# Patient Record
Sex: MALE | Race: WHITE | NOT HISPANIC OR LATINO | Employment: FULL TIME | URBAN - METROPOLITAN AREA
[De-identification: names, ages, dates, MRNs, and addresses within clinical notes are randomized per-mention and may not be internally consistent; named-entity substitution may affect disease eponyms.]

---

## 2018-07-31 ENCOUNTER — TELEPHONE (OUTPATIENT)
Dept: ENDOCRINOLOGY | Facility: CLINIC | Age: 28
End: 2018-07-31

## 2018-07-31 NOTE — TELEPHONE ENCOUNTER
Called and spoke to the patients mother Roya whom says that they had to reschedule the appointment and will try reschedule online or call back if they can not.

## 2018-07-31 NOTE — TELEPHONE ENCOUNTER
----- Message from Mayuri Birch sent at 7/27/2018  9:46 AM CDT -----  Contact: Candy Orr (Mother)  Candy Orr (Mother) calling to reschedule appointment for later time on 8/1/18. She states that patient would like a time between 12 pm-2 pm. No available appointments. Please advise.   Call back    Thanks!

## 2018-11-15 ENCOUNTER — LAB VISIT (OUTPATIENT)
Dept: LAB | Facility: HOSPITAL | Age: 28
End: 2018-11-15
Attending: PHYSICIAN ASSISTANT
Payer: COMMERCIAL

## 2018-11-15 ENCOUNTER — OFFICE VISIT (OUTPATIENT)
Dept: ENDOCRINOLOGY | Facility: CLINIC | Age: 28
End: 2018-11-15
Payer: COMMERCIAL

## 2018-11-15 VITALS
HEIGHT: 77 IN | TEMPERATURE: 98 F | HEART RATE: 60 BPM | BODY MASS INDEX: 20.96 KG/M2 | RESPIRATION RATE: 18 BRPM | SYSTOLIC BLOOD PRESSURE: 118 MMHG | WEIGHT: 177.5 LBS | DIASTOLIC BLOOD PRESSURE: 69 MMHG

## 2018-11-15 DIAGNOSIS — E10.9 TYPE 1 DIABETES MELLITUS WITHOUT COMPLICATION: ICD-10-CM

## 2018-11-15 DIAGNOSIS — E27.1 ADDISON'S DISEASE: ICD-10-CM

## 2018-11-15 DIAGNOSIS — Z96.41 INSULIN PUMP STATUS: ICD-10-CM

## 2018-11-15 DIAGNOSIS — E27.1 ADDISON'S DISEASE: Primary | ICD-10-CM

## 2018-11-15 LAB
25(OH)D3+25(OH)D2 SERPL-MCNC: 36 NG/ML
ALBUMIN SERPL BCP-MCNC: 3.6 G/DL
ALP SERPL-CCNC: 52 U/L
ALT SERPL W/O P-5'-P-CCNC: 24 U/L
ANION GAP SERPL CALC-SCNC: 7 MMOL/L
AST SERPL-CCNC: 28 U/L
BASOPHILS # BLD AUTO: 0.13 K/UL
BASOPHILS NFR BLD: 1.4 %
BILIRUB SERPL-MCNC: 0.8 MG/DL
BUN SERPL-MCNC: 14 MG/DL
CALCIUM SERPL-MCNC: 9.2 MG/DL
CHLORIDE SERPL-SCNC: 102 MMOL/L
CO2 SERPL-SCNC: 29 MMOL/L
CORTIS SERPL-MCNC: <1 UG/DL
CREAT SERPL-MCNC: 0.9 MG/DL
DIFFERENTIAL METHOD: NORMAL
EOSINOPHIL # BLD AUTO: 0.4 K/UL
EOSINOPHIL NFR BLD: 4.6 %
ERYTHROCYTE [DISTWIDTH] IN BLOOD BY AUTOMATED COUNT: 12.9 %
EST. GFR  (AFRICAN AMERICAN): >60 ML/MIN/1.73 M^2
EST. GFR  (NON AFRICAN AMERICAN): >60 ML/MIN/1.73 M^2
ESTIMATED AVG GLUCOSE: 232 MG/DL
GLUCOSE SERPL-MCNC: 181 MG/DL
HBA1C MFR BLD HPLC: 9.7 %
HCT VFR BLD AUTO: 43.6 %
HGB BLD-MCNC: 14.5 G/DL
IMM GRANULOCYTES # BLD AUTO: 0.04 K/UL
IMM GRANULOCYTES NFR BLD AUTO: 0.4 %
LYMPHOCYTES # BLD AUTO: 2.8 K/UL
LYMPHOCYTES NFR BLD: 28.8 %
MCH RBC QN AUTO: 31 PG
MCHC RBC AUTO-ENTMCNC: 33.3 G/DL
MCV RBC AUTO: 93 FL
MONOCYTES # BLD AUTO: 0.7 K/UL
MONOCYTES NFR BLD: 7 %
NEUTROPHILS # BLD AUTO: 5.5 K/UL
NEUTROPHILS NFR BLD: 57.8 %
NRBC BLD-RTO: 0 /100 WBC
PLATELET # BLD AUTO: 194 K/UL
PMV BLD AUTO: 12.5 FL
POTASSIUM SERPL-SCNC: 3.6 MMOL/L
PROT SERPL-MCNC: 6.1 G/DL
RBC # BLD AUTO: 4.68 M/UL
SODIUM SERPL-SCNC: 138 MMOL/L
T3 SERPL-MCNC: 87 NG/DL
T4 FREE SERPL-MCNC: 1.07 NG/DL
TSH SERPL DL<=0.005 MIU/L-ACNC: 0.59 UIU/ML
WBC # BLD AUTO: 9.55 K/UL

## 2018-11-15 PROCEDURE — 84443 ASSAY THYROID STIM HORMONE: CPT

## 2018-11-15 PROCEDURE — 84480 ASSAY TRIIODOTHYRONINE (T3): CPT

## 2018-11-15 PROCEDURE — 82533 TOTAL CORTISOL: CPT

## 2018-11-15 PROCEDURE — 84378 SUGARS SINGLE QUANT: CPT

## 2018-11-15 PROCEDURE — 85025 COMPLETE CBC W/AUTO DIFF WBC: CPT

## 2018-11-15 PROCEDURE — 82306 VITAMIN D 25 HYDROXY: CPT

## 2018-11-15 PROCEDURE — 80053 COMPREHEN METABOLIC PANEL: CPT

## 2018-11-15 PROCEDURE — 84244 ASSAY OF RENIN: CPT

## 2018-11-15 PROCEDURE — 82088 ASSAY OF ALDOSTERONE: CPT

## 2018-11-15 PROCEDURE — 82985 ASSAY OF GLYCATED PROTEIN: CPT

## 2018-11-15 PROCEDURE — 36415 COLL VENOUS BLD VENIPUNCTURE: CPT | Mod: PO

## 2018-11-15 PROCEDURE — 3008F BODY MASS INDEX DOCD: CPT | Mod: CPTII,S$GLB,, | Performed by: PHYSICIAN ASSISTANT

## 2018-11-15 PROCEDURE — 99205 OFFICE O/P NEW HI 60 MIN: CPT | Mod: S$GLB,,, | Performed by: PHYSICIAN ASSISTANT

## 2018-11-15 PROCEDURE — 83036 HEMOGLOBIN GLYCOSYLATED A1C: CPT

## 2018-11-15 PROCEDURE — 84439 ASSAY OF FREE THYROXINE: CPT

## 2018-11-15 PROCEDURE — 99999 PR PBB SHADOW E&M-EST. PATIENT-LVL IV: CPT | Mod: PBBFAC,,, | Performed by: PHYSICIAN ASSISTANT

## 2018-11-15 PROCEDURE — 3046F HEMOGLOBIN A1C LEVEL >9.0%: CPT | Mod: CPTII,S$GLB,, | Performed by: PHYSICIAN ASSISTANT

## 2018-11-15 PROCEDURE — 82024 ASSAY OF ACTH: CPT

## 2018-11-15 RX ORDER — INSULIN ASPART 100 [IU]/ML
INJECTION, SOLUTION INTRAVENOUS; SUBCUTANEOUS
COMMUNITY
End: 2018-11-15

## 2018-11-15 RX ORDER — INSULIN GLARGINE 100 [IU]/ML
INJECTION, SOLUTION SUBCUTANEOUS
Qty: 1 VIAL | Refills: 5 | Status: SHIPPED | OUTPATIENT
Start: 2018-11-15 | End: 2018-12-04

## 2018-11-15 RX ORDER — PREDNISONE 20 MG/1
20 TABLET ORAL DAILY
Qty: 90 TABLET | Refills: 2 | Status: SHIPPED | OUTPATIENT
Start: 2018-11-15 | End: 2018-11-16

## 2018-11-15 RX ORDER — INSULIN ASPART 100 [IU]/ML
INJECTION, SOLUTION INTRAVENOUS; SUBCUTANEOUS
Qty: 9 VIAL | Refills: 1 | Status: SHIPPED | OUTPATIENT
Start: 2018-11-15 | End: 2019-11-22 | Stop reason: SDUPTHER

## 2018-11-15 NOTE — PROGRESS NOTES
CC: DM/Braden's Disease    HPI: Irvin Orr was diagnosed with Type 1  DM in 2004. Multiple hospitalizations for hyper and hypoglycemia with seizures. No fhx of braden's disease, DM or thyroid disease.     New to endocrine today.    Pt has been out his his strips and pump supplies for ~2 weeks. He is using Novolog with I:C ratio of 1:6 and will correct during the night.     Name of Device or Devices used by the patient: Sharelook  When did you start the current therapy you are on: 5 years ago  Frequency of changing site/infusion set/tubing/cartridges: Every other day  Frequency of changing CGM: 2 days  Using bolus wizard: yes  Taking bolus with each meal: yes    BG readings are checked 2x/day.     Avg carbs: 161  avg bolus: 6 units    Avg TDD: 65 units  ADB: 43.6 units,  67%  Avg bolus: 21.3 units,  33%    Settings:  Basal  MN: 1.80 u/hr  3:00 am 1.90 u/hr  11:00 1.60 u/hr  16:30 2.20 u/hr    I:C 1:6  ISF 40  Target   IOB 3 hr      Hypoglycemia: None. He will feel shaky and sweaty if his BG is <70.   Type of Glucose Meter: contour next link    Physical Activity: He plays softball and goes to the gym twice weekly.     Last Eye Exam: He has not been in a while.   Last Podiatry Exam: n/a    Last DM Education Attended: never    Braden's Disease  Dx 1995  Prednisone 20 mg qam  Florinef 0.1 mg daily (two tablets in the am)  No fatigue, muscle weakness, n/v or salt craving  Sometimes feels light headed when standing  He has tried hydrocortisone in the past but states his metabolized this too fast.     Social Hx: He smokes 0.5 ppd for 11 years. He drinks ETOH occasionally.    REVIEW OF SYSTEMS  General: no weakness or fatigue.   Eyes: no intermittent blurry vision or visual disturbances.   Cardiac: no chest pain or palpitations.  Respiratory: no cough or dyspnea.   GI: no abdominal pain or nausea.   Skin: no rashes or itching.   Neuro: no numbness or tingling.   Endocrine: no polyuria, polydipsia, polyphagia.  "  Remainder ROS negative    Vital Signs  /69 (BP Location: Right arm, Patient Position: Sitting, BP Method: Medium (Automatic))   Pulse 60   Temp 97.6 °F (36.4 °C) (Oral)   Resp 18   Ht 6' 5" (1.956 m)   Wt 80.5 kg (177 lb 7.5 oz)   BMI 21.04 kg/m²     Personally reviewed labs below:   Hemoglobin A1C   Date Value Ref Range Status   04/18/2008 10.2 (H) 4.5 - 6.2 % Final       Chemistry        Component Value Date/Time     01/01/2014 1850    K 3.9 01/01/2014 1850     01/01/2014 1850    CO2 22 (L) 01/01/2014 1850    BUN 19 01/01/2014 1850    CREATININE 0.9 01/01/2014 1850     (H) 01/01/2014 1850        Component Value Date/Time    CALCIUM 8.7 01/01/2014 1850    ALKPHOS 69 01/01/2014 1850    AST 58 (H) 01/01/2014 1850    ALT 21 01/01/2014 1850    BILITOT 1.7 (H) 01/01/2014 1850    ESTGFRAFRICA >60 01/01/2014 1850    EGFRNONAA >60 01/01/2014 1850        Lab Results   Component Value Date    CHOL 157 04/18/2008     Lab Results   Component Value Date    HDL 42 04/18/2008     Lab Results   Component Value Date    LDLCALC 90.6 04/18/2008     Lab Results   Component Value Date    TRIG 122 04/18/2008     Lab Results   Component Value Date    CHOLHDL 26.8 04/18/2008       Lab Results   Component Value Date    TSH 1.2 04/18/2008       Lab Results   Component Value Date    MICALBCREAT Unable to calculate 04/14/2008     No results found for: [id number]    PHYSICAL EXAMINATION  Constitutional: young male, appears well, no distress  Neck: Supple, trachea midline.   Respiratory: even and unlabored, CTAB without wheezes.  Cardiovascular: RRR; no carotid bruits or murmurs.   Lymph: DP pulses  2+ bilaterally; no edema.   Skin: warm and dry; no injection site reactions, no acanthosis nigracans observed.  Neuro: patient alert and cooperative; CN 2-12 grossly intact  Foot Exam: no sores or macerations noted.     Protective Sensation (w/ 10 gram monofilament):  Right: Intact  Left: Intact    Visual " Inspection:  Normal -  Bilateral and Nails Intact - without Evidence of Foot Deformity- Bilateral    Pedal Pulses:   Right: Present  Left: Present    Posterior tibialis:   Right:Present  Left: Present     Vibratory Sensation  Right:Decreased  Left:Decreased    Assessment/Plan    1. Braden's disease  ACTH    Aldosterone    Renin    Cortisol, 8AM   2. Type 1 diabetes mellitus without complication  T4, free    T3    TSH    Vitamin D    Hemoglobin A1c    GlycoMark (TM)    Fructosamine    Microalbumin/creatinine urine ratio    Comprehensive metabolic panel    CBC auto differential    Ambulatory Referral to Diabetes Education    insulin aspart U-100 (NOVOLOG U-100 INSULIN ASPART) 100 unit/mL injection    predniSONE (DELTASONE) 20 MG tablet    blood sugar diagnostic Strp    insulin glargine (LANTUS) 100 unit/mL injection   3. Insulin pump status       Robertson's Disease-decrease prednisone to 10 mg daily and fludrocortisone to  0.1 mg daily. Rx cortef injections for emergiencies.  [id number] pump settings. There were not any BG readings in the pump. Return for pump download after receiving supplies. Start Lantus 40 units qhs until pump supplies are received. Advised to have an eye exam. Referral to DE for a pump evaluation since his pump is ~5 years old.   Insulin pump status-see above    FOLLOW UP  1 mth

## 2018-11-16 DIAGNOSIS — E27.1 ADDISON'S DISEASE: Primary | ICD-10-CM

## 2018-11-16 DIAGNOSIS — E27.1 ADDISON'S DISEASE: ICD-10-CM

## 2018-11-16 RX ORDER — PREDNISONE 5 MG/1
TABLET ORAL
Qty: 53 TABLET | Refills: 0 | Status: SHIPPED | OUTPATIENT
Start: 2018-11-16 | End: 2019-11-22

## 2018-11-16 RX ORDER — FLUDROCORTISONE ACETATE 0.1 MG/1
TABLET ORAL
Qty: 90 TABLET | Refills: 3 | Status: SHIPPED | OUTPATIENT
Start: 2018-11-16 | End: 2018-12-04 | Stop reason: SDUPTHER

## 2018-11-16 RX ORDER — PREDNISONE 10 MG/1
10 TABLET ORAL DAILY
Qty: 90 TABLET | Refills: 3 | Status: SHIPPED | OUTPATIENT
Start: 2018-11-16 | End: 2019-02-14

## 2018-11-16 NOTE — PROGRESS NOTES
Decrease dose of prednisone 17.5 for 3 weeks, 15 mg for three weeks to 10 mg daily. Decrease fludrocortisone to 150 mcg daily for 3 weeks and then to 100 mcg thereafter.

## 2018-11-19 ENCOUNTER — TELEPHONE (OUTPATIENT)
Dept: ENDOCRINOLOGY | Facility: CLINIC | Age: 28
End: 2018-11-19

## 2018-11-19 LAB
ALDOST SERPL-MCNC: <3 NG/DL
FRUCTOSAMINE SERPL-SCNC: 733 UMOL /L (ref 151–300)
RENIN PLAS-CCNC: 1.5 NG/ML/H

## 2018-11-19 NOTE — TELEPHONE ENCOUNTER
----- Message from Lilia Palencia sent at 11/16/2018  4:56 PM CST -----  Contact: Francia (grandmother)  States that someone has called her phone 3 times regarding Irvin-- she can be reached at [phone]--Thanks

## 2018-11-20 LAB — ACTH PLAS-MCNC: 450 PG/ML

## 2018-11-21 LAB — GLYCOMARK (TM): <1 UG/ML

## 2018-12-04 RX ORDER — FLUDROCORTISONE ACETATE 0.1 MG/1
TABLET ORAL
Qty: 90 TABLET | Refills: 3 | Status: SHIPPED | OUTPATIENT
Start: 2018-12-04 | End: 2019-11-22 | Stop reason: SDUPTHER

## 2018-12-04 NOTE — TELEPHONE ENCOUNTER
----- Message from Catie Andres sent at 12/4/2018 12:02 PM CST -----  Contact: [facility]  ..Type: Needs Medical Advice    Who Called: [facility]  Best Call Back Number:   Additional Information: Saritha is requesting to speak with a nurse to get an alternative for pt's medication(fludrocortisone (FLORINEF) 0.1 mg Tab) due to medication being on back order.  Please call to advise  Thanks

## 2018-12-04 NOTE — TELEPHONE ENCOUNTER
lantus vials not covered Levemir is covered. And Fludrocortisone is on back order could not give a date medication will come in would like to know if you would like to give an different medication .

## 2018-12-05 NOTE — TELEPHONE ENCOUNTER
----- Message from Tim Cotton sent at 12/5/2018  1:16 PM CST -----  Contact: Wife/Derion  Derion called in regarding the attached patient ().  Jaceksury wanted to make sure office had received the fax from [facility] requesting patients clinical notes so patient can get his infusion.    Man's call back number is [phone]

## 2018-12-10 ENCOUNTER — TELEPHONE (OUTPATIENT)
Dept: ENDOCRINOLOGY | Facility: CLINIC | Age: 28
End: 2018-12-10

## 2018-12-10 NOTE — TELEPHONE ENCOUNTER
Received call regarding patient from a female caller whom stated that Diabetes management has been trying to fax over some paperwork so the patient can get his infusion sets. Called Diabetes management and spoke to Caitlin   whom is faxing over the paperwork. If we do not receive it she can be reached at ext [phone].

## 2018-12-13 ENCOUNTER — TELEPHONE (OUTPATIENT)
Dept: ENDOCRINOLOGY | Facility: CLINIC | Age: 28
End: 2018-12-13

## 2018-12-13 ENCOUNTER — OFFICE VISIT (OUTPATIENT)
Dept: ENDOCRINOLOGY | Facility: CLINIC | Age: 28
End: 2018-12-13
Payer: COMMERCIAL

## 2018-12-13 VITALS
WEIGHT: 175.06 LBS | SYSTOLIC BLOOD PRESSURE: 115 MMHG | BODY MASS INDEX: 20.67 KG/M2 | HEART RATE: 75 BPM | HEIGHT: 77 IN | DIASTOLIC BLOOD PRESSURE: 70 MMHG

## 2018-12-13 DIAGNOSIS — E10.9 TYPE 1 DIABETES MELLITUS WITHOUT COMPLICATION: ICD-10-CM

## 2018-12-13 DIAGNOSIS — E27.1 ADDISON'S DISEASE: Primary | ICD-10-CM

## 2018-12-13 PROCEDURE — 3008F BODY MASS INDEX DOCD: CPT | Mod: CPTII,S$GLB,, | Performed by: PHYSICIAN ASSISTANT

## 2018-12-13 PROCEDURE — 99213 OFFICE O/P EST LOW 20 MIN: CPT | Mod: S$GLB,,, | Performed by: PHYSICIAN ASSISTANT

## 2018-12-13 PROCEDURE — 99999 PR PBB SHADOW E&M-EST. PATIENT-LVL III: CPT | Mod: PBBFAC,,, | Performed by: PHYSICIAN ASSISTANT

## 2018-12-13 PROCEDURE — 3046F HEMOGLOBIN A1C LEVEL >9.0%: CPT | Mod: CPTII,S$GLB,, | Performed by: PHYSICIAN ASSISTANT

## 2018-12-13 NOTE — TELEPHONE ENCOUNTER
Called and spoke to Marilynn with [facility] and she received the fax for the infusion sets but now is needing paperwork for medical necessity signed for the patients infusion pump and continuous glucose monitor. Marilynn is faxed over the paperwork to be signed, filled out and faxed back to them.

## 2018-12-13 NOTE — TELEPHONE ENCOUNTER
----- Message from Edilma Herrera sent at 12/13/2018  3:07 PM CST -----  Contact: Marilynn Subramanian with [facility] [phone] Ext [phone] is calling to ask if the document request was received on this patient that was faxed on 12 11 18/please advise

## 2018-12-13 NOTE — PROGRESS NOTES
"CC: DM/Braden's Disease    HPI: Irvin Orr was diagnosed with Type 1  DM in 2004. Multiple hospitalizations for hyper and hypoglycemia associated with seizures. No fhx of braden's disease, DM or thyroid disease.     Arrives with girlfriend today.    Pt has been out his his strips and pump supplies. He is using Novolog with I:C ratio of 1:6 and will correct during the night. Has not picked up levemir yet.      He has not been checking his BG.     Hypoglycemia: None. He will feel shaky and sweaty if his BG is <70.   Type of Glucose Meter: contour next link    Physical Activity: He plays softball and goes to the gym twice weekly.     Last Eye Exam: He has not been in a while.   Last Podiatry Exam: n/a    Last DM Education Attended: He is going tomorrow    Braden's Disease  Dx 1995  Prednisone 15 mg qam and will be decreasing to 10 mg within a couple weeks.  Florinef 0.1 mg daily (two tablets in the am)  No fatigue, muscle weakness, n/v or salt craving  Sometimes feels light headed when standing  He has tried hydrocortisone in the past but states his metabolized this too fast.     Social Hx: He smokes 0.5 ppd for 11 years. He drinks ETOH occasionally.    REVIEW OF SYSTEMS  General: no weakness or fatigue.   Eyes: no intermittent blurry vision or visual disturbances.   Cardiac: no chest pain or palpitations.  Respiratory: no cough or dyspnea.   GI: no abdominal pain or nausea.   Skin: no rashes or itching.   Neuro: no numbness or tingling.   Endocrine: no polyuria, polydipsia, polyphagia.   Remainder ROS negative    Vital Signs  /70 (BP Location: Right arm, Patient Position: Sitting, BP Method: Medium (Automatic))   Pulse 75   Ht 6' 5" (1.956 m)   Wt 79.4 kg (175 lb 0.7 oz)   BMI 20.76 kg/m²     Personally reviewed labs below:   Hemoglobin A1C   Date Value Ref Range Status   11/15/2018 9.7 (H) 4.0 - 5.6 % Final     Comment:     ADA Screening Guidelines:  5.7-6.4%  Consistent with prediabetes  >or=6.5%  " Consistent with diabetes  High levels of fetal hemoglobin interfere with the HbA1C  assay. Heterozygous hemoglobin variants (HbS, HgC, etc)do  not significantly interfere with this assay.   However, presence of multiple variants may affect accuracy.     04/18/2008 10.2 (H) 4.5 - 6.2 % Final       Chemistry        Component Value Date/Time     11/15/2018 1440    K 3.6 11/15/2018 1440     11/15/2018 1440    CO2 29 11/15/2018 1440    BUN 14 11/15/2018 1440    CREATININE 0.9 11/15/2018 1440     (H) 11/15/2018 1440        Component Value Date/Time    CALCIUM 9.2 11/15/2018 1440    ALKPHOS 52 (L) 11/15/2018 1440    AST 28 11/15/2018 1440    ALT 24 11/15/2018 1440    BILITOT 0.8 11/15/2018 1440    ESTGFRAFRICA >60.0 11/15/2018 1440    EGFRNONAA >60.0 11/15/2018 1440        Lab Results   Component Value Date    CHOL 157 04/18/2008     Lab Results   Component Value Date    HDL 42 04/18/2008     Lab Results   Component Value Date    LDLCALC 90.6 04/18/2008     Lab Results   Component Value Date    TRIG 122 04/18/2008     Lab Results   Component Value Date    CHOLHDL 26.8 04/18/2008       Lab Results   Component Value Date    TSH 0.595 11/15/2018       Lab Results   Component Value Date    MICALBCREAT Unable to calculate 04/14/2008     Vit D, 25-Hydroxy   Date Value Ref Range Status   11/15/2018 36 30 - 96 ng/mL Final     Comment:     Vitamin D deficiency.........<10 ng/mL                              Vitamin D insufficiency......10-29 ng/mL       Vitamin D sufficiency........> or equal to 30 ng/mL  Vitamin D toxicity............>100 ng/mL       Previous exam 11/18  PHYSICAL EXAMINATION  Constitutional: young male, appears well, no distress  Neck: Supple, trachea midline.   Respiratory: even and unlabored, CTAB without wheezes.  Cardiovascular: RRR; no carotid bruits or murmurs.   Lymph: DP pulses  2+ bilaterally; no edema.   Skin: warm and dry; no injection site reactions, no acanthosis nigracans  observed.  Neuro: patient alert and cooperative; CN 2-12 grossly intact  Foot Exam: no sores or macerations noted.     Protective Sensation (w/ 10 gram monofilament):  Right: Intact  Left: Intact    Visual Inspection:  Normal -  Bilateral and Nails Intact - without Evidence of Foot Deformity- Bilateral    Pedal Pulses:   Right: Present  Left: Present    Posterior tibialis:   Right:Present  Left: Present     Vibratory Sensation  Right:Decreased  Left:Decreased    Assessment/Plan    1. Burleson's disease     2. Type 1 diabetes mellitus without complication       Burleson's Disease-decrease prednisone to 10 mg daily and fludrocortisone to  0.1 mg daily.   H7WN-Gwnwvlhu cho ratio. Start Levemir 40 units qhs. Advised to have an eye exam. Referral to DE for a pump evaluation since his pump is ~5 years old. Insulin pump status-see above    FOLLOW UP  1 mth

## 2018-12-14 NOTE — TELEPHONE ENCOUNTER
----- Message from Lena Fraire sent at 12/14/2018  2:32 PM CST -----  Contact: Lola with [facility]  Type: Needs Medical Advice    Who Called:  Lola with [facility]  Symptoms (please be specific):  na  How long has patient had these symptoms:  dave  Pharmacy name and phone #:  dave  Best Call Back Number: [phone]. [phone]   Additional Information: Calling to check the status of the paper work that has been sent to the office regarding the patient's insulin pump and continuous glucose monitor. Please call to update. If it has been received please fax back to [phone]/ Thank you!

## 2018-12-14 NOTE — TELEPHONE ENCOUNTER
Faxed over patients paperwork regarding the patient's insulin pump and continuous glucose monitor to the number provided. Called Diabetes management to inform and received the answering service. Called the patient insurance regarding the patients diabetic strips and after quit some time spoke to Micaela whom informed me that I needed the patients procedure code and diagnosis code before they could assist me regarding the patients diabetic strips.

## 2018-12-16 RX ORDER — PREDNISONE 5 MG/1
TABLET ORAL
Qty: 53 TABLET | Refills: 0 | OUTPATIENT
Start: 2018-12-16

## 2018-12-17 NOTE — TELEPHONE ENCOUNTER
Called the patients [facility] pharmacy on file and was informed that the insurance will cover test strips for the One Touch Verio meter. Called the patient and left a message to call back to the clinic

## 2018-12-17 NOTE — TELEPHONE ENCOUNTER
----- Message from Denise Slade sent at 12/17/2018  3:57 PM CST -----  Type:  Patient Returning Call    Who Called:  Patient  Who Left Message for Patient:  Antony  Does the patient know what this is regarding?:  NA  Best Call Back Number:  [phone]  Additional Information:

## 2018-12-17 NOTE — TELEPHONE ENCOUNTER
Called patient and and left a message to call back to the clinic regarding what meter he is currently using.

## 2019-09-08 DIAGNOSIS — E10.9 TYPE 1 DIABETES MELLITUS WITHOUT COMPLICATION: ICD-10-CM

## 2019-09-08 RX ORDER — PREDNISONE 20 MG/1
TABLET ORAL
Qty: 90 TABLET | Refills: 2 | OUTPATIENT
Start: 2019-09-08

## 2019-11-22 ENCOUNTER — OFFICE VISIT (OUTPATIENT)
Dept: ENDOCRINOLOGY | Facility: CLINIC | Age: 29
End: 2019-11-22
Payer: COMMERCIAL

## 2019-11-22 ENCOUNTER — LAB VISIT (OUTPATIENT)
Dept: LAB | Facility: HOSPITAL | Age: 29
End: 2019-11-22
Attending: PHYSICIAN ASSISTANT
Payer: COMMERCIAL

## 2019-11-22 VITALS
BODY MASS INDEX: 20.29 KG/M2 | SYSTOLIC BLOOD PRESSURE: 110 MMHG | HEIGHT: 77 IN | HEART RATE: 88 BPM | WEIGHT: 171.88 LBS | TEMPERATURE: 98 F | DIASTOLIC BLOOD PRESSURE: 80 MMHG

## 2019-11-22 DIAGNOSIS — E27.1 ADDISON'S DISEASE: ICD-10-CM

## 2019-11-22 DIAGNOSIS — Z96.41 INSULIN PUMP STATUS: ICD-10-CM

## 2019-11-22 DIAGNOSIS — E27.1 ADDISON'S DISEASE: Primary | ICD-10-CM

## 2019-11-22 DIAGNOSIS — E10.9 TYPE 1 DIABETES MELLITUS WITHOUT COMPLICATION: ICD-10-CM

## 2019-11-22 LAB
ALBUMIN SERPL BCP-MCNC: 4.7 G/DL (ref 3.5–5.2)
ALP SERPL-CCNC: 56 U/L (ref 55–135)
ALT SERPL W/O P-5'-P-CCNC: 21 U/L (ref 10–44)
ANION GAP SERPL CALC-SCNC: 12 MMOL/L (ref 8–16)
AST SERPL-CCNC: 26 U/L (ref 10–40)
BASOPHILS # BLD AUTO: 0.19 K/UL (ref 0–0.2)
BASOPHILS NFR BLD: 1.2 % (ref 0–1.9)
BILIRUB SERPL-MCNC: 0.7 MG/DL (ref 0.1–1)
BUN SERPL-MCNC: 29 MG/DL (ref 6–20)
CALCIUM SERPL-MCNC: 10.2 MG/DL (ref 8.7–10.5)
CHLORIDE SERPL-SCNC: 96 MMOL/L (ref 95–110)
CHOLEST SERPL-MCNC: 262 MG/DL (ref 120–199)
CHOLEST/HDLC SERPL: 3.2 {RATIO} (ref 2–5)
CO2 SERPL-SCNC: 25 MMOL/L (ref 23–29)
CORTIS SERPL-MCNC: 1 UG/DL
CREAT SERPL-MCNC: 1.4 MG/DL (ref 0.5–1.4)
DHEA-S SERPL-MCNC: 39.1 UG/DL (ref 167.9–591.9)
DIFFERENTIAL METHOD: ABNORMAL
EOSINOPHIL # BLD AUTO: 0.4 K/UL (ref 0–0.5)
EOSINOPHIL NFR BLD: 2.4 % (ref 0–8)
ERYTHROCYTE [DISTWIDTH] IN BLOOD BY AUTOMATED COUNT: 12.3 % (ref 11.5–14.5)
EST. GFR  (AFRICAN AMERICAN): >60 ML/MIN/1.73 M^2
EST. GFR  (NON AFRICAN AMERICAN): >60 ML/MIN/1.73 M^2
ESTIMATED AVG GLUCOSE: 194 MG/DL (ref 68–131)
GLUCOSE SERPL-MCNC: 151 MG/DL (ref 70–110)
HBA1C MFR BLD HPLC: 8.4 % (ref 4–5.6)
HCT VFR BLD AUTO: 52 % (ref 40–54)
HDLC SERPL-MCNC: 83 MG/DL (ref 40–75)
HDLC SERPL: 31.7 % (ref 20–50)
HGB BLD-MCNC: 16.9 G/DL (ref 14–18)
IMM GRANULOCYTES # BLD AUTO: 0.21 K/UL (ref 0–0.04)
IMM GRANULOCYTES NFR BLD AUTO: 1.3 % (ref 0–0.5)
LDLC SERPL CALC-MCNC: 150.4 MG/DL (ref 63–159)
LYMPHOCYTES # BLD AUTO: 3.7 K/UL (ref 1–4.8)
LYMPHOCYTES NFR BLD: 23.4 % (ref 18–48)
MCH RBC QN AUTO: 32.1 PG (ref 27–31)
MCHC RBC AUTO-ENTMCNC: 32.5 G/DL (ref 32–36)
MCV RBC AUTO: 99 FL (ref 82–98)
MONOCYTES # BLD AUTO: 1.2 K/UL (ref 0.3–1)
MONOCYTES NFR BLD: 7.3 % (ref 4–15)
NEUTROPHILS # BLD AUTO: 10.1 K/UL (ref 1.8–7.7)
NEUTROPHILS NFR BLD: 64.4 % (ref 38–73)
NONHDLC SERPL-MCNC: 179 MG/DL
NRBC BLD-RTO: 0 /100 WBC
PLATELET # BLD AUTO: 290 K/UL (ref 150–350)
PMV BLD AUTO: 11.7 FL (ref 9.2–12.9)
POTASSIUM SERPL-SCNC: 4.5 MMOL/L (ref 3.5–5.1)
PROT SERPL-MCNC: 7.9 G/DL (ref 6–8.4)
RBC # BLD AUTO: 5.27 M/UL (ref 4.6–6.2)
SODIUM SERPL-SCNC: 133 MMOL/L (ref 136–145)
T4 FREE SERPL-MCNC: 1.25 NG/DL (ref 0.71–1.51)
TRIGL SERPL-MCNC: 143 MG/DL (ref 30–150)
TSH SERPL DL<=0.005 MIU/L-ACNC: 1.52 UIU/ML (ref 0.4–4)
WBC # BLD AUTO: 15.7 K/UL (ref 3.9–12.7)

## 2019-11-22 PROCEDURE — 84443 ASSAY THYROID STIM HORMONE: CPT

## 2019-11-22 PROCEDURE — 80061 LIPID PANEL: CPT

## 2019-11-22 PROCEDURE — 3008F BODY MASS INDEX DOCD: CPT | Mod: CPTII,S$GLB,, | Performed by: PHYSICIAN ASSISTANT

## 2019-11-22 PROCEDURE — 83036 HEMOGLOBIN GLYCOSYLATED A1C: CPT

## 2019-11-22 PROCEDURE — 84439 ASSAY OF FREE THYROXINE: CPT

## 2019-11-22 PROCEDURE — 36415 COLL VENOUS BLD VENIPUNCTURE: CPT | Mod: PO

## 2019-11-22 PROCEDURE — 82626 DEHYDROEPIANDROSTERONE: CPT

## 2019-11-22 PROCEDURE — 3008F PR BODY MASS INDEX (BMI) DOCUMENTED: ICD-10-PCS | Mod: CPTII,S$GLB,, | Performed by: PHYSICIAN ASSISTANT

## 2019-11-22 PROCEDURE — 99999 PR PBB SHADOW E&M-EST. PATIENT-LVL IV: CPT | Mod: PBBFAC,,, | Performed by: PHYSICIAN ASSISTANT

## 2019-11-22 PROCEDURE — 82024 ASSAY OF ACTH: CPT

## 2019-11-22 PROCEDURE — 99214 PR OFFICE/OUTPT VISIT, EST, LEVL IV, 30-39 MIN: ICD-10-PCS | Mod: S$GLB,,, | Performed by: PHYSICIAN ASSISTANT

## 2019-11-22 PROCEDURE — 85025 COMPLETE CBC W/AUTO DIFF WBC: CPT

## 2019-11-22 PROCEDURE — 82533 TOTAL CORTISOL: CPT

## 2019-11-22 PROCEDURE — 80053 COMPREHEN METABOLIC PANEL: CPT

## 2019-11-22 PROCEDURE — 82627 DEHYDROEPIANDROSTERONE: CPT

## 2019-11-22 PROCEDURE — 99214 OFFICE O/P EST MOD 30 MIN: CPT | Mod: S$GLB,,, | Performed by: PHYSICIAN ASSISTANT

## 2019-11-22 PROCEDURE — 99999 PR PBB SHADOW E&M-EST. PATIENT-LVL IV: ICD-10-PCS | Mod: PBBFAC,,, | Performed by: PHYSICIAN ASSISTANT

## 2019-11-22 RX ORDER — PREDNISONE 10 MG/1
10 TABLET ORAL DAILY
Qty: 120 TABLET | Refills: 3 | Status: SHIPPED | OUTPATIENT
Start: 2019-11-22 | End: 2021-01-07

## 2019-11-22 RX ORDER — FLUDROCORTISONE ACETATE 0.1 MG/1
TABLET ORAL
Qty: 90 TABLET | Refills: 3 | Status: SHIPPED | OUTPATIENT
Start: 2019-11-22 | End: 2020-10-14

## 2019-11-22 RX ORDER — PREDNISONE 5 MG/1
TABLET ORAL
Qty: 120 TABLET | Refills: 3 | Status: SHIPPED | OUTPATIENT
Start: 2019-11-22 | End: 2021-01-07

## 2019-11-22 RX ORDER — INSULIN ASPART 100 [IU]/ML
INJECTION, SOLUTION INTRAVENOUS; SUBCUTANEOUS
Qty: 10 VIAL | Refills: 3 | Status: SHIPPED | OUTPATIENT
Start: 2019-11-22 | End: 2020-02-13

## 2019-11-22 NOTE — PROGRESS NOTES
CC: DM/Braden's Disease    HPI: Irvin Orr was diagnosed with Type 1  DM in 2004. Multiple hospitalizations for hyper and hypoglycemia associated with seizures. No fhx of braden's disease, DM or thyroid disease.     Arrives with girlfriend today. Pt has been sick that past couple of weeks.    Has not picked up levemir yet.      Name of Device or Devices used by the patient: minimed 530 G  When did you start the current therapy you are on: 6-7 years ago  Frequency of changing site/infusion: 2 days   set/tubing/cartridges: 2 days  Frequency of changing CGM: n/a  Using bolus wizard: no  Taking bolus with each meal: yes  He has not been checking his BG. He is interested in the Dexcom. States he has missed work due to hypoglycemia and hyperglycemia.    Hypoglycemia: None. He will feel shaky and sweaty if his BG is <70.   Type of Glucose Meter: contour next link    Physical Activity: None    Last Eye Exam: He has not been in a while.   Last Podiatry Exam: n/a    Last DM Education Attended: He is going tomorrow    Sharkey's Disease  Dx 1995  Prednisone 20 mg qd.   Florinef 0.1 mg daily (two tablets in the am)  + salt craving  No fatigue, muscle weakness, n/v.   He has tried hydrocortisone in the past but states his metabolized this too fast. He has not had to stress dose. Tried to titrate prednisone dose last year to 10 mg but he felt lightheaded and increased the dose back to 20 mg.    Social Hx: He smokes 0.5 ppd for 11 years. He drinks ETOH occasionally. 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    REVIEW OF SYSTEMS  General: no weakness or fatigue.   Eyes: no intermittent blurry vision or visual disturbances.   Cardiac: no chest pain or palpitations.  Respiratory: no cough or dyspnea.   GI: no abdominal pain or nausea.   Skin: no rashes or itching.   Neuro: no numbness or tingling.   Endocrine: no polyuria, polydipsia, polyphagia.   Remainder ROS negative    Vital Signs  /80 (BP Location: Left arm, Patient Position: Sitting, BP  "Method: Small (Manual))   Pulse 88   Temp 98 °F (36.7 °C) (Oral)   Ht 6' 5" (1.956 m)   Wt 78 kg (171 lb 13.6 oz)   BMI 20.38 kg/m²     Personally reviewed labs below:   Hemoglobin A1C   Date Value Ref Range Status   11/15/2018 9.7 (H) 4.0 - 5.6 % Final     Comment:     ADA Screening Guidelines:  5.7-6.4%  Consistent with prediabetes  >or=6.5%  Consistent with diabetes  High levels of fetal hemoglobin interfere with the HbA1C  assay. Heterozygous hemoglobin variants (HbS, HgC, etc)do  not significantly interfere with this assay.   However, presence of multiple variants may affect accuracy.     04/18/2008 10.2 (H) 4.5 - 6.2 % Final       Chemistry        Component Value Date/Time     11/15/2018 1440    K 3.6 11/15/2018 1440     11/15/2018 1440    CO2 29 11/15/2018 1440    BUN 14 11/15/2018 1440    CREATININE 0.9 11/15/2018 1440     (H) 11/15/2018 1440        Component Value Date/Time    CALCIUM 9.2 11/15/2018 1440    ALKPHOS 52 (L) 11/15/2018 1440    AST 28 11/15/2018 1440    ALT 24 11/15/2018 1440    BILITOT 0.8 11/15/2018 1440    ESTGFRAFRICA >60.0 11/15/2018 1440    EGFRNONAA >60.0 11/15/2018 1440        Lab Results   Component Value Date    CHOL 157 04/18/2008     Lab Results   Component Value Date    HDL 42 04/18/2008     Lab Results   Component Value Date    LDLCALC 90.6 04/18/2008     Lab Results   Component Value Date    TRIG 122 04/18/2008     Lab Results   Component Value Date    CHOLHDL 26.8 04/18/2008     Lab Results   Component Value Date    TSH 0.595 11/15/2018     Lab Results   Component Value Date    MICALBCREAT Unable to calculate 04/14/2008     Vit D, 25-Hydroxy   Date Value Ref Range Status   11/15/2018 36 30 - 96 ng/mL Final     Comment:     Vitamin D deficiency.........<10 ng/mL                              Vitamin D insufficiency......10-29 ng/mL       Vitamin D sufficiency........> or equal to 30 ng/mL  Vitamin D toxicity............>100 ng/mL       PHYSICAL " EXAMINATION  Constitutional: young male, appears well, no distress  Neck: Supple, trachea midline.   Respiratory: even and unlabored, CTAB without wheezes.  Cardiovascular: RRR; no carotid bruits or murmurs.   Lymph: DP pulses  2+ bilaterally; no edema.   Skin: warm and dry; no injection site reactions, no acanthosis nigracans observed.  Neuro: patient alert and cooperative; CN 2-12 grossly intact  Foot Exam: no sores or macerations noted.     Protective Sensation (w/ 10 gram monofilament):  Right: Intact  Left: Intact    Visual Inspection:  Normal -  Bilateral and Nails Intact - without Evidence of Foot Deformity- Bilateral    Pedal Pulses:   Right: Present  Left: Present    Posterior tibialis:   Right:Present  Left: Present     Vibratory Sensation  Right:Decreased  Left:Decreased    Assessment/Plan    1. Broome's disease  Hemoglobin A1c    ACTH    Cortisol    T4, free    TSH    Microalbumin/creatinine urine ratio    Comprehensive metabolic panel    CBC auto differential    Lipid panel    DHEA    DHEA-sulfate    insulin detemir U-100 (LEVEMIR U-100 INSULIN) 100 unit/mL injection    insulin aspart U-100 (NOVOLOG U-100 INSULIN ASPART) 100 unit/mL injection    fludrocortisone (FLORINEF) 0.1 mg Tab    blood sugar diagnostic Strp    predniSONE (DELTASONE) 5 MG tablet    predniSONE (DELTASONE) 10 MG tablet   2. Type 1 diabetes mellitus without complication  Hemoglobin A1c    ACTH    Cortisol    T4, free    TSH    Microalbumin/creatinine urine ratio    Comprehensive metabolic panel    CBC auto differential    Lipid panel    DHEA    DHEA-sulfate    insulin aspart U-100 (NOVOLOG U-100 INSULIN ASPART) 100 unit/mL injection    blood sugar diagnostic Strp    Ambulatory Referral to Diabetes Education   3. Insulin pump status       Braden's Disease-decrease prednisone to 15 mg daily and fludrocortisone to  0.1 mg daily. Discussed when to stress dose.  [id number] today. Continue current settings. Start using the dexcom sensor.  Sent Levemir 40 units qhs for pump malfunction. Advised to have an eye exam. Referral to DE for a pump evaluation and pump dl. Pt will have new insurance in January and wants to wait to order sensor. BG checks ac/hs.   Insulin pump status-see above    FOLLOW UP  3 mths

## 2019-11-26 LAB — ACTH PLAS-MCNC: 180 PG/ML (ref 0–46)

## 2019-11-27 ENCOUNTER — TELEPHONE (OUTPATIENT)
Dept: ENDOCRINOLOGY | Facility: CLINIC | Age: 29
End: 2019-11-27

## 2019-11-27 LAB — DHEA SERPL-MCNC: 0.31 NG/ML (ref 1.33–7.78)

## 2019-11-27 NOTE — TELEPHONE ENCOUNTER
Pt will  paper at the [facility] sometime this week., pt is aware we are closed for Thanksgiving.

## 2019-11-27 NOTE — TELEPHONE ENCOUNTER
----- Message from Kristine Shah MA sent at 11/26/2019  4:44 PM CST -----  Contact: Pt      ----- Message -----  From: Adolfo Werner  Sent: 11/26/2019   3:55 PM CST  To: Ryan Simmons Staff    Pt called and would like to know if you would email his [place] paperwork to him.    His email address is [email or web address]

## 2020-02-13 DIAGNOSIS — E10.9 TYPE 1 DIABETES MELLITUS WITHOUT COMPLICATION: ICD-10-CM

## 2020-02-13 DIAGNOSIS — E27.1 ADDISON'S DISEASE: ICD-10-CM

## 2020-02-13 RX ORDER — INSULIN ASPART 100 [IU]/ML
INJECTION, SOLUTION INTRAVENOUS; SUBCUTANEOUS
Qty: 9 VIAL | Refills: 0 | Status: SHIPPED | OUTPATIENT
Start: 2020-02-13 | End: 2020-04-02 | Stop reason: SDUPTHER

## 2020-02-13 RX ORDER — PREDNISONE 20 MG/1
20 TABLET ORAL DAILY
Qty: 90 TABLET | Refills: 0 | Status: SHIPPED | OUTPATIENT
Start: 2020-02-13 | End: 2020-04-02 | Stop reason: SDUPTHER

## 2020-02-13 NOTE — TELEPHONE ENCOUNTER
----- Message from Prachi De La Garza sent at 2/13/2020  1:00 PM CST -----  Contact: Jeff Ventura  Type:  RX Refill Request    Who Called:  Jeff Majorill or New Rx:  Refill  RX Name and Strength: insulin aspart U-100 (NOVOLOG U-100 INSULIN ASPART) 100 unit/mL injection  How is the patient currently taking it? (ex. 1XDay):    Is this a 30 day or 90 day RX:  Needs 90 day supply. Currently lasts one week.  Preferred Pharmacy with phone number:    [facility]/pharmacy #[id number] [place], [place] - [address] [address]  Phone: [phone] Fax: [phone]  Local or Mail Order: local  Ordering Provider:  SANA Davis  Best Call Back Number:    Additional Information:  Pt would like a 90 day supply on meds. Completely out and last qty called in only lasts a week.

## 2020-04-02 DIAGNOSIS — E27.1 ADDISON'S DISEASE: ICD-10-CM

## 2020-04-02 DIAGNOSIS — E10.9 TYPE 1 DIABETES MELLITUS WITHOUT COMPLICATION: ICD-10-CM

## 2020-04-02 RX ORDER — INSULIN ASPART 100 [IU]/ML
INJECTION, SOLUTION INTRAVENOUS; SUBCUTANEOUS
Qty: 9 VIAL | Refills: 2 | Status: SHIPPED | OUTPATIENT
Start: 2020-04-02 | End: 2021-01-07 | Stop reason: SDUPTHER

## 2020-04-02 RX ORDER — PREDNISONE 20 MG/1
20 TABLET ORAL DAILY
Qty: 90 TABLET | Refills: 1 | Status: SHIPPED | OUTPATIENT
Start: 2020-04-02 | End: 2020-10-14

## 2020-04-02 NOTE — TELEPHONE ENCOUNTER
----- Message from Audrey Acosta sent at 4/2/2020  3:37 PM CDT -----  Type:  RX Refill Request    Who Called:  Candy Orr (Mother)  Refill or New Rx:  refill  RX Name and Strength:   1.  insulin aspart U-100 (NOVOLOG U-100 INSULIN ASPART) 100 unit/mL injection  2. predniSONE (DELTASONE) 20 MG tablet  Is this a 30 day or 90 day RX:  90 day supply   Preferred Pharmacy with phone number:    [facility]/pharmacy #[id number] [place] [address] [address] [address]  Phone: [phone] Fax: [phone]  Local or Mail Order:  local  Ordering Provider:  Ryan Pineda Call Back Number:  [phone]  Additional Information:

## 2020-05-27 ENCOUNTER — TELEPHONE (OUTPATIENT)
Dept: ENDOCRINOLOGY | Facility: CLINIC | Age: 30
End: 2020-05-27

## 2020-05-27 NOTE — TELEPHONE ENCOUNTER
----- Message from Carolyn Riddle sent at 5/27/2020  2:38 PM CDT -----  Contact: Pt's Mother  Pt's Mother requesting Medical Records for the Pt    Please call and advise    Phone [phone]

## 2020-05-27 NOTE — TELEPHONE ENCOUNTER
----- Message from Oly Ariza sent at 5/27/2020  3:20 PM CDT -----  Type:  Patient Returning Call    Who Called:  Mom/Roya  Who Left Message for Patient:  Jennifer  Does the patient know what this is regarding?:  Medical records  Best Call Back Number:  [phone]

## 2021-01-05 ENCOUNTER — PATIENT MESSAGE (OUTPATIENT)
Dept: ENDOCRINOLOGY | Facility: CLINIC | Age: 31
End: 2021-01-05

## 2021-01-07 ENCOUNTER — OFFICE VISIT (OUTPATIENT)
Dept: ENDOCRINOLOGY | Facility: CLINIC | Age: 31
End: 2021-01-07
Payer: MEDICAID

## 2021-01-07 DIAGNOSIS — E10.9 TYPE 1 DIABETES MELLITUS WITHOUT COMPLICATION: ICD-10-CM

## 2021-01-07 DIAGNOSIS — E27.1 ADDISON'S DISEASE: Primary | ICD-10-CM

## 2021-01-07 PROCEDURE — 99213 PR OFFICE/OUTPT VISIT, EST, LEVL III, 20-29 MIN: ICD-10-PCS | Mod: 95,,, | Performed by: PHYSICIAN ASSISTANT

## 2021-01-07 PROCEDURE — 99213 OFFICE O/P EST LOW 20 MIN: CPT | Mod: 95,,, | Performed by: PHYSICIAN ASSISTANT

## 2021-01-07 RX ORDER — INSULIN ASPART 100 [IU]/ML
INJECTION, SOLUTION INTRAVENOUS; SUBCUTANEOUS
Qty: 9 VIAL | Refills: 2 | Status: SHIPPED | OUTPATIENT
Start: 2021-01-07 | End: 2021-09-24

## 2021-01-07 RX ORDER — INSULIN DETEMIR 100 [IU]/ML
INJECTION, SOLUTION SUBCUTANEOUS
Qty: 10 ML | Refills: 3 | Status: SHIPPED | OUTPATIENT
Start: 2021-01-07 | End: 2021-04-12

## 2021-01-07 RX ORDER — PREDNISONE 20 MG/1
20 TABLET ORAL DAILY
Qty: 90 TABLET | Refills: 1 | Status: SHIPPED | OUTPATIENT
Start: 2021-01-07 | End: 2021-09-13

## 2021-01-07 RX ORDER — FLUDROCORTISONE ACETATE 0.1 MG/1
TABLET ORAL
Qty: 90 TABLET | Refills: 1 | Status: SHIPPED | OUTPATIENT
Start: 2021-01-07 | End: 2021-03-18

## 2021-01-07 RX ORDER — INSULIN PUMP SYRINGE, 3 ML
EACH MISCELLANEOUS
Qty: 1 EACH | Refills: 0 | Status: SHIPPED | OUTPATIENT
Start: 2021-01-07

## 2021-02-17 ENCOUNTER — PATIENT MESSAGE (OUTPATIENT)
Dept: ENDOCRINOLOGY | Facility: CLINIC | Age: 31
End: 2021-02-17

## 2021-02-18 ENCOUNTER — PATIENT MESSAGE (OUTPATIENT)
Dept: ENDOCRINOLOGY | Facility: CLINIC | Age: 31
End: 2021-02-18

## 2021-03-12 ENCOUNTER — CLINICAL SUPPORT (OUTPATIENT)
Dept: DIABETES | Facility: CLINIC | Age: 31
End: 2021-03-12
Payer: MEDICAID

## 2021-03-12 ENCOUNTER — PATIENT MESSAGE (OUTPATIENT)
Dept: DIABETES | Facility: CLINIC | Age: 31
End: 2021-03-12

## 2021-03-12 VITALS — WEIGHT: 171.94 LBS | HEIGHT: 77 IN | BODY MASS INDEX: 20.3 KG/M2

## 2021-03-12 DIAGNOSIS — E10.9 TYPE 1 DIABETES MELLITUS WITHOUT COMPLICATION: ICD-10-CM

## 2021-03-12 DIAGNOSIS — E10.9 TYPE 1 DIABETES MELLITUS WITHOUT COMPLICATION: Primary | ICD-10-CM

## 2021-03-12 PROCEDURE — G0108 PR DIAB MANAGE TRN  PER INDIV: ICD-10-PCS | Mod: 95,,, | Performed by: DIETITIAN, REGISTERED

## 2021-03-12 PROCEDURE — G0108 DIAB MANAGE TRN  PER INDIV: HCPCS | Mod: 95,,, | Performed by: DIETITIAN, REGISTERED

## 2021-03-16 ENCOUNTER — PATIENT MESSAGE (OUTPATIENT)
Dept: DIABETES | Facility: CLINIC | Age: 31
End: 2021-03-16

## 2021-03-17 ENCOUNTER — LAB VISIT (OUTPATIENT)
Dept: LAB | Facility: HOSPITAL | Age: 31
End: 2021-03-17
Attending: PHYSICIAN ASSISTANT
Payer: MEDICAID

## 2021-03-17 DIAGNOSIS — E10.9 TYPE 1 DIABETES MELLITUS WITHOUT COMPLICATION: ICD-10-CM

## 2021-03-17 LAB
ALBUMIN SERPL BCP-MCNC: 4.1 G/DL (ref 3.5–5.2)
ALP SERPL-CCNC: 64 U/L (ref 55–135)
ALT SERPL W/O P-5'-P-CCNC: 14 U/L (ref 10–44)
ANION GAP SERPL CALC-SCNC: 9 MMOL/L (ref 8–16)
AST SERPL-CCNC: 14 U/L (ref 10–40)
BASOPHILS # BLD AUTO: 0.1 K/UL (ref 0–0.2)
BASOPHILS NFR BLD: 0.9 % (ref 0–1.9)
BILIRUB SERPL-MCNC: 0.5 MG/DL (ref 0.1–1)
BUN SERPL-MCNC: 9 MG/DL (ref 6–20)
C PEPTIDE SERPL-MCNC: <0.08 NG/ML (ref 0.78–5.19)
CALCIUM SERPL-MCNC: 9.7 MG/DL (ref 8.7–10.5)
CHLORIDE SERPL-SCNC: 99 MMOL/L (ref 95–110)
CO2 SERPL-SCNC: 31 MMOL/L (ref 23–29)
CREAT SERPL-MCNC: 0.9 MG/DL (ref 0.5–1.4)
DIFFERENTIAL METHOD: ABNORMAL
EOSINOPHIL # BLD AUTO: 0.1 K/UL (ref 0–0.5)
EOSINOPHIL NFR BLD: 0.5 % (ref 0–8)
ERYTHROCYTE [DISTWIDTH] IN BLOOD BY AUTOMATED COUNT: 11.3 % (ref 11.5–14.5)
EST. GFR  (AFRICAN AMERICAN): >60 ML/MIN/1.73 M^2
EST. GFR  (NON AFRICAN AMERICAN): >60 ML/MIN/1.73 M^2
ESTIMATED AVG GLUCOSE: 209 MG/DL (ref 68–131)
GLUCOSE SERPL-MCNC: 183 MG/DL (ref 70–110)
HBA1C MFR BLD: 8.9 % (ref 4–5.6)
HCT VFR BLD AUTO: 46.4 % (ref 40–54)
HGB BLD-MCNC: 16.2 G/DL (ref 14–18)
IMM GRANULOCYTES # BLD AUTO: 0.08 K/UL (ref 0–0.04)
IMM GRANULOCYTES NFR BLD AUTO: 0.7 % (ref 0–0.5)
LYMPHOCYTES # BLD AUTO: 1.4 K/UL (ref 1–4.8)
LYMPHOCYTES NFR BLD: 11.8 % (ref 18–48)
MCH RBC QN AUTO: 32.2 PG (ref 27–31)
MCHC RBC AUTO-ENTMCNC: 34.9 G/DL (ref 32–36)
MCV RBC AUTO: 92 FL (ref 82–98)
MONOCYTES # BLD AUTO: 0.4 K/UL (ref 0.3–1)
MONOCYTES NFR BLD: 3.3 % (ref 4–15)
NEUTROPHILS # BLD AUTO: 9.7 K/UL (ref 1.8–7.7)
NEUTROPHILS NFR BLD: 82.8 % (ref 38–73)
NRBC BLD-RTO: 0 /100 WBC
PLATELET # BLD AUTO: 186 K/UL (ref 150–350)
PMV BLD AUTO: 12.4 FL (ref 9.2–12.9)
POTASSIUM SERPL-SCNC: 5.5 MMOL/L (ref 3.5–5.1)
PROT SERPL-MCNC: 6.9 G/DL (ref 6–8.4)
RBC # BLD AUTO: 5.03 M/UL (ref 4.6–6.2)
SODIUM SERPL-SCNC: 139 MMOL/L (ref 136–145)
T4 FREE SERPL-MCNC: 0.92 NG/DL (ref 0.71–1.51)
TSH SERPL DL<=0.005 MIU/L-ACNC: 0.16 UIU/ML (ref 0.4–4)
WBC # BLD AUTO: 11.65 K/UL (ref 3.9–12.7)

## 2021-03-17 PROCEDURE — 80053 COMPREHEN METABOLIC PANEL: CPT | Performed by: PHYSICIAN ASSISTANT

## 2021-03-17 PROCEDURE — 84681 ASSAY OF C-PEPTIDE: CPT | Performed by: PHYSICIAN ASSISTANT

## 2021-03-17 PROCEDURE — 84443 ASSAY THYROID STIM HORMONE: CPT | Performed by: PHYSICIAN ASSISTANT

## 2021-03-17 PROCEDURE — 85025 COMPLETE CBC W/AUTO DIFF WBC: CPT | Performed by: PHYSICIAN ASSISTANT

## 2021-03-17 PROCEDURE — 83036 HEMOGLOBIN GLYCOSYLATED A1C: CPT | Performed by: PHYSICIAN ASSISTANT

## 2021-03-17 PROCEDURE — 84439 ASSAY OF FREE THYROXINE: CPT | Performed by: PHYSICIAN ASSISTANT

## 2021-03-17 PROCEDURE — 36415 COLL VENOUS BLD VENIPUNCTURE: CPT | Performed by: PHYSICIAN ASSISTANT

## 2021-03-18 ENCOUNTER — TELEPHONE (OUTPATIENT)
Dept: ENDOCRINOLOGY | Facility: CLINIC | Age: 31
End: 2021-03-18

## 2021-03-18 ENCOUNTER — PATIENT MESSAGE (OUTPATIENT)
Dept: ENDOCRINOLOGY | Facility: CLINIC | Age: 31
End: 2021-03-18

## 2021-03-18 DIAGNOSIS — E27.1 ADDISON'S DISEASE: ICD-10-CM

## 2021-03-18 DIAGNOSIS — E87.5 HYPERKALEMIA: ICD-10-CM

## 2021-03-18 DIAGNOSIS — R79.89 LOW TSH LEVEL: Primary | ICD-10-CM

## 2021-03-18 DIAGNOSIS — E10.9 TYPE 1 DIABETES MELLITUS WITHOUT COMPLICATION: ICD-10-CM

## 2021-03-18 RX ORDER — FLUDROCORTISONE ACETATE 0.1 MG/1
TABLET ORAL
Qty: 135 TABLET | Refills: 1 | Status: SHIPPED | OUTPATIENT
Start: 2021-03-18 | End: 2021-10-25

## 2021-03-19 RX ORDER — BLOOD-GLUCOSE SENSOR
EACH MISCELLANEOUS
Qty: 3 DEVICE | Refills: 12 | Status: SHIPPED | OUTPATIENT
Start: 2021-03-19

## 2021-03-19 RX ORDER — BLOOD-GLUCOSE TRANSMITTER
EACH MISCELLANEOUS
Qty: 1 DEVICE | Refills: 3 | Status: SHIPPED | OUTPATIENT
Start: 2021-03-19 | End: 2021-08-18

## 2021-04-12 ENCOUNTER — PATIENT MESSAGE (OUTPATIENT)
Dept: RESEARCH | Facility: HOSPITAL | Age: 31
End: 2021-04-12

## 2021-04-14 ENCOUNTER — PATIENT MESSAGE (OUTPATIENT)
Dept: DIABETES | Facility: CLINIC | Age: 31
End: 2021-04-14

## 2021-04-16 ENCOUNTER — PATIENT MESSAGE (OUTPATIENT)
Dept: DIABETES | Facility: CLINIC | Age: 31
End: 2021-04-16

## 2021-11-22 DIAGNOSIS — E27.1 ADDISON'S DISEASE: ICD-10-CM

## 2021-11-22 DIAGNOSIS — E10.9 TYPE 1 DIABETES MELLITUS WITHOUT COMPLICATION: ICD-10-CM

## 2021-11-22 RX ORDER — INSULIN ASPART 100 [IU]/ML
INJECTION, SOLUTION INTRAVENOUS; SUBCUTANEOUS
Qty: 90 ML | Refills: 0 | Status: SHIPPED | OUTPATIENT
Start: 2021-11-22

## 2022-03-07 ENCOUNTER — TELEPHONE (OUTPATIENT)
Dept: ENDOCRINOLOGY | Facility: CLINIC | Age: 32
End: 2022-03-07
Payer: MEDICAID